# Patient Record
Sex: MALE | Race: WHITE | NOT HISPANIC OR LATINO | Employment: UNEMPLOYED | URBAN - NONMETROPOLITAN AREA
[De-identification: names, ages, dates, MRNs, and addresses within clinical notes are randomized per-mention and may not be internally consistent; named-entity substitution may affect disease eponyms.]

---

## 2023-10-30 ENCOUNTER — OFFICE VISIT (OUTPATIENT)
Dept: URGENT CARE | Facility: CLINIC | Age: 53
End: 2023-10-30
Payer: COMMERCIAL

## 2023-10-30 VITALS
OXYGEN SATURATION: 97 % | WEIGHT: 202.3 LBS | SYSTOLIC BLOOD PRESSURE: 160 MMHG | HEIGHT: 67 IN | BODY MASS INDEX: 31.75 KG/M2 | RESPIRATION RATE: 16 BRPM | HEART RATE: 77 BPM | DIASTOLIC BLOOD PRESSURE: 104 MMHG | TEMPERATURE: 98.9 F

## 2023-10-30 DIAGNOSIS — L98.9 FACIAL SKIN LESION: ICD-10-CM

## 2023-10-30 DIAGNOSIS — I10 UNCONTROLLED HYPERTENSION: ICD-10-CM

## 2023-10-30 DIAGNOSIS — J01.40 ACUTE NON-RECURRENT PANSINUSITIS: Primary | ICD-10-CM

## 2023-10-30 PROBLEM — K91.1 POSTSURGICAL DUMPING SYNDROME: Status: ACTIVE | Noted: 2022-10-26

## 2023-10-30 PROBLEM — E78.5 HLD (HYPERLIPIDEMIA): Status: ACTIVE | Noted: 2017-05-12

## 2023-10-30 PROBLEM — F17.200 SMOKER: Status: ACTIVE | Noted: 2023-10-30

## 2023-10-30 PROCEDURE — 99212 OFFICE O/P EST SF 10 MIN: CPT

## 2023-10-30 RX ORDER — DEXTROMETHORPHAN HBR AND PYRILAMINE MALEATE 30; 30 MG/1; MG/1
1 TABLET ORAL EVERY 6 HOURS
Qty: 28 TABLET | Refills: 0 | Status: SHIPPED | OUTPATIENT
Start: 2023-10-30 | End: 2023-11-06

## 2023-10-30 RX ORDER — AZITHROMYCIN 250 MG/1
TABLET, FILM COATED ORAL
Qty: 6 TABLET | Refills: 0 | Status: SHIPPED | OUTPATIENT
Start: 2023-10-30 | End: 2023-11-04

## 2023-10-30 RX ORDER — AZELASTINE 1 MG/ML
2 SPRAY, METERED NASAL 2 TIMES DAILY
Qty: 30 ML | Refills: 0 | Status: SHIPPED | OUTPATIENT
Start: 2023-10-30 | End: 2023-11-14

## 2023-10-30 RX ORDER — ALBUTEROL SULFATE 90 UG/1
1 AEROSOL, METERED RESPIRATORY (INHALATION) EVERY 4 HOURS PRN
COMMUNITY

## 2023-10-30 ASSESSMENT — VISUAL ACUITY: OU: 1

## 2023-10-30 NOTE — PROGRESS NOTES
[facility]RG[facility] CARE [place] NOTE:      Name: Darci Major Jr., 53 y.o.    CSN:[id number]   MRN:[id number]    PCP: Trent Torres MD    ALL:    Allergies   Allergen Reactions    Penicillins Other and Unknown     When he was a child.  Unsure of reaction       History:    Chief Complaint: Sore Throat (Sore throat, sinus pressure, nausea)    Encounter Date: 10/30/2023  14:17hrs    HPI: The history was obtained from the patient. Darci is a 53 y.o. male, who presents with a chief complaint of Sore Throat (Sore throat, sinus pressure, nausea) less than 6 days of sore throat, nasal congestion, sinus pressure, mentions no fever, generalized malaise or headache, denies smell or taste changes and would not like to pursue any testing for COVID-19, flu RSV.    Patient mentions some GI distress associated with this illness.        PMHx:    CAD  Hypertension  Dyslipidemia  History of respiratory failure/insufficiency status post surgical procedure 2016          Current Outpatient Medications   Medication Sig Dispense Refill    azelastine (Astelin) 137 mcg (0.1 %) nasal spray Administer 2 sprays into each nostril 2 times a day for 15 days. Use in each nostril as directed 30 mL 0    azithromycin (Zithromax Z-Dayron) 250 mg tablet Take 2 tablets (500 mg) on  Day 1,  followed by 1 tablet (250 mg) once daily on Days 2 through 5. 6 tablet 0    pyrilamine-dextromethorphan ([place] DMT) 30-30 mg tablet Take 1 tablet by mouth every 6 hours for 7 days. 28 tablet 0    Ventolin HFA 90 mcg/actuation inhaler Inhale 1 puff every 4 hours if needed for shortness of breath or wheezing.       No current facility-administered medications for this visit.         PMSx:    Past Surgical History:   Procedure Laterality Date    HERNIA REPAIR  04/23/2018    Hernia Repair    OTHER SURGICAL HISTORY  04/23/2018    Cholecystotomy       Fam Hx: No family history on file.    SOC. Hx:     Social History     Socioeconomic History    Marital status:       Spouse name: Not on file    Number of children: Not on file    Years of education: Not on file    Highest education level: Not on file   Occupational History    Not on file   Tobacco Use    Smoking status: Every Day     Types: Cigarettes    Smokeless tobacco: Never   Substance and Sexual Activity    Alcohol use: Not on file    Drug use: Not on file    Sexual activity: Not on file   Other Topics Concern    Not on file   Social History Narrative    Not on file     Social Determinants of Health     Financial Resource Strain: Not on file   Food Insecurity: Not on file   Transportation Needs: Not on file   Physical Activity: Not on file   Stress: Not on file   Social Connections: Not on file   Intimate Partner Violence: Not on file   Housing Stability: Not on file         Vitals:    10/30/23 1403   BP: (!) 160/104   Pulse: 77   Resp: 16   Temp: 37.2 °C (98.9 °F)   SpO2: 97%     91.8 kg (202 lb 4.8 oz)          Physical Exam  Constitutional:       Appearance: Normal appearance. He is normal weight.   HENT:      Head: Normocephalic and atraumatic.      Nose: Congestion and rhinorrhea present. No nasal tenderness. Rhinorrhea is clear.      Mouth/Throat:      Mouth: Mucous membranes are moist.   Eyes:      General: Lids are normal. Vision grossly intact.      Extraocular Movements: Extraocular movements intact.     Cardiovascular:      Rate and Rhythm: Normal rate and regular rhythm.   Pulmonary:      Effort: Pulmonary effort is normal.      Breath sounds: Normal breath sounds. No decreased air movement. No decreased breath sounds.   Abdominal:      General: Abdomen is flat.   Musculoskeletal:         General: Normal range of motion.      Cervical back: Normal range of motion and neck supple.      Comments: Grossly normal strength   Skin:     General: Skin is warm.      Capillary Refill: Capillary refill takes less than 2 seconds.      Findings: Lesion (Noted left infraorbital lesion seems to be waxy and pearly,  erythematous suspicious for BCC) present.   Neurological:      Mental Status: He is alert and oriented to person, place, and time.   Psychiatric:         Behavior: Behavior normal.         LABORATORY @ RADIOLOGICAL IMAGING (if done):     Patient was not interested in any testing today     COURSE/MEDICAL DECISION MAKING:    Darci is a 53 y.o., who presents with a working diagnosis of   1. Acute non-recurrent pansinusitis    2. Facial skin lesion    3. Uncontrolled hypertension     with a differential to include: Influenza, parainfluenza, rhinovirus, adenovirus, metapneumovirus, coronavirus, COVID-19, postnasal drip, strep pharyngitis, GERD, retropharyngeal abscess, tonsillitis, adenitis, seasonal allergies    Patient has a longstanding history of respiratory conditions most interesting happened in 2016 where he was admitted to the hospital following an abdominal hernia repair.  He was sent home on oxygen for a few months and has since then required per record inhalers periodically.      He also has persistent nasal congestion and    Clinical Impression:  1. Acute non-recurrent pansinusitis    2. Facial skin lesion    3. Uncontrolled hypertension        Jose Ramon Kay PA-C   Advanced Practice Provider  [facility] URGENT [facility]ARE

## 2023-10-30 NOTE — PATIENT INSTRUCTIONS
"What is sinusitis?  Sinusitis is a condition that can cause a stuffy nose, pain in the face, and discharge or \"mucus\" from the nose.    The sinuses are hollow areas in the bones of the face (figure 1). They have a thin lining that normally makes a small amount of mucus. When this lining gets irritated or infected, it swells and makes extra mucus. This causes symptoms.    Sinusitis usually happens after a person gets sick with a cold. The germs causing the cold can infect the sinuses, too. Sometimes, other germs can be the cause of the infection. Often, a person feels like their cold is getting better. But then, they get sinusitis and begin to feel sick again.    What are the symptoms of sinusitis?  Common symptoms of sinusitis include:    ?Stuffy or blocked nose    ?Thick white, yellow, or green discharge from the nose    ?Pain in the teeth    ?Pain or pressure in the face - This often feels worse when a person bends forward.    People with sinusitis can also have other symptoms, such as:    ?Fever    ?Cough    ?Trouble smelling    ?Ear pressure or fullness    ?Headache    ?Bad breath    ?Feeling tired    Most of the time, symptoms start to improve in 7 to 10 days.    Should I see a doctor or nurse?  See your doctor or nurse if your symptoms last more than 10 days, or if your symptoms first get better but then get worse.    Rarely, sinusitis can lead to serious problems. See your doctor or nurse right away (do not wait 10 days) if you have:    ?Fever higher than 102°F (38.9°C)    ?Sudden and severe pain in the face and head    ?Trouble seeing, or seeing double    ?Trouble thinking clearly    ?Swelling or redness around 1 or both eyes    ?Stiff neck    Is there anything I can do on my own to feel better?  Yes. To help with your symptoms, you can:    ?Take an over-the-counter pain reliever to reduce the pain.    ?Rinse your nose and sinuses with salt water a few times a day - Ask your doctor or nurse about the best " "way to do this.    ?Drink plenty of fluids - Staying hydrated might help to thin the mucus and make it drain more easily.    Your doctor might also recommend a steroid nose spray to reduce the swelling in your nose, especially if you have allergies. Talk to your doctor if you are thinking of using a steroid spray.    How is sinusitis treated?  Most of the time, sinusitis does not need to be treated with antibiotic medicines. This is because most sinusitis is caused by viruses, not bacteria, and antibiotics do not kill viruses. In fact, even sinusitis caused by bacteria will usually get better on its own without antibiotics.    Some people with sinusitis do need treatment with antibiotics. If your symptoms have not improved after 10 days, ask your doctor if you should take antibiotics. They might recommend that you wait 1 more week to see if your symptoms improve. But if you have symptoms such as a fever or a lot of pain, they might prescribe antibiotics. If you do get antibiotics, follow all of your doctor's instructions about taking them.    What if my symptoms do not get better?  If your symptoms do not get better, talk with your doctor or nurse. They might order tests to figure out why you still have symptoms. These can include:    ?CT scan or other imaging tests - Imaging tests create pictures of the inside of the body.    ?A test to look inside the sinuses - For this test, a doctor puts a thin tube with a camera on the end into the nose and up into the sinuses.    Some people get a lot of sinus infections or have symptoms that last at least 3 months. These people can have a different type of sinusitis called \"chronic sinusitis.\" Chronic sinusitis can be caused by different things. For example, some people have growths inside their nose or sinuses that are called \"polyps.\" Other people have allergies that cause their symptoms.    Chronic sinusitis can be treated in different ways. If you have chronic sinusitis, " "talk with your doctor about which treatments are right for you.        What is high blood pressure?  High blood pressure is a condition that puts you at risk for heart attack, stroke, and kidney disease. It does not usually cause symptoms. But it can be serious.    When your doctor or nurse tells you your blood pressure, they say 2 numbers. For instance, your doctor or nurse might say that your blood pressure is \"130 over 80.\" The top number is the pressure inside your arteries when your heart is bill. The bottom number is the pressure inside your arteries when your heart is relaxed.    \"Elevated blood pressure\" is a term doctors or nurses use as a warning. People with elevated blood pressure do not yet have high blood pressure. But their blood pressure is not as low as it should be for good health.    Many experts define high, elevated, and normal blood pressure as follows:    ?High - Top number of 130 or above and/or bottom number of 80 or above.    ?Elevated - Top number between 120 and 129 and bottom number of 79 or below.    ?Normal - Top number of 119 or below and bottom number of 79 or below.    This information is also in the table (table 1).    How can I lower my blood pressure?  If your doctor or nurse prescribed blood pressure medicine, the most important thing you can do is to take it. If it causes side effects, do not just stop taking it. Instead, talk to your doctor or nurse about the problems it causes. They might be able to lower your dose or switch you to another medicine. If cost is a problem, mention that, too. They might be able to put you on a less expensive medicine. Taking your blood pressure medicine can keep you from having a heart attack or stroke, and it can save your life!    Can I do anything on my own?  You have a lot of control over your blood pressure. To lower it:    ?Lose weight (if you are overweight).    ?Choose a diet low in fat and rich in fruits, vegetables, and low-fat " dairy products.    ?Eat less salt.    ?Do something active for at least 30 minutes a day on most days of the week.    ?Drink less alcohol (if you drink more than 2 alcoholic drinks per day).    It's also a good idea to get a home blood pressure meter. People who check their own blood pressure at home do better at keeping it low and can sometimes even reduce the amount of medicine they take.    Basal cell carcinoma (BCC) is the most common form of skin cancer and the most frequently occurring form of all cancers. In the [place] alone, an estimated 3.6 million cases are diagnosed each year. BCCs arise from abnormal, uncontrolled growth of basal cells.    Because BCCs grow slowly, most are curable and cause minimal damage when caught and treated early. Understanding BCC causes, risk factors and warning signs can help you detect them early, when they are easiest to treat and cure.

## 2023-11-30 DIAGNOSIS — J01.40 ACUTE NON-RECURRENT PANSINUSITIS: ICD-10-CM

## 2023-11-30 DIAGNOSIS — L98.9 FACIAL SKIN LESION: ICD-10-CM

## 2024-01-17 RX ORDER — AZELASTINE 1 MG/ML
2 SPRAY, METERED NASAL 2 TIMES DAILY
OUTPATIENT
Start: 2024-01-17 | End: 2024-02-01

## 2024-09-13 ENCOUNTER — APPOINTMENT (OUTPATIENT)
Dept: URGENT CARE | Facility: CLINIC | Age: 54
End: 2024-09-13
Payer: COMMERCIAL

## 2025-01-10 ENCOUNTER — OFFICE VISIT (OUTPATIENT)
Dept: URGENT CARE | Facility: CLINIC | Age: 55
End: 2025-01-10
Payer: COMMERCIAL

## 2025-01-10 VITALS
WEIGHT: 200 LBS | SYSTOLIC BLOOD PRESSURE: 147 MMHG | DIASTOLIC BLOOD PRESSURE: 99 MMHG | HEART RATE: 92 BPM | TEMPERATURE: 98.5 F | BODY MASS INDEX: 31.32 KG/M2 | OXYGEN SATURATION: 94 % | RESPIRATION RATE: 16 BRPM

## 2025-01-10 DIAGNOSIS — J01.90 ACUTE SINUSITIS, RECURRENCE NOT SPECIFIED, UNSPECIFIED LOCATION: Primary | ICD-10-CM

## 2025-01-10 PROCEDURE — 99213 OFFICE O/P EST LOW 20 MIN: CPT | Performed by: NURSE PRACTITIONER

## 2025-01-10 RX ORDER — BENZONATATE 100 MG/1
100-200 CAPSULE ORAL EVERY 8 HOURS PRN
Qty: 40 CAPSULE | Refills: 0 | Status: SHIPPED | OUTPATIENT
Start: 2025-01-10

## 2025-01-10 RX ORDER — DOXYCYCLINE 100 MG/1
100 TABLET ORAL 2 TIMES DAILY
Qty: 20 TABLET | Refills: 0 | Status: SHIPPED | OUTPATIENT
Start: 2025-01-10 | End: 2025-01-20

## 2025-01-10 NOTE — PROGRESS NOTES
Left message for patient to call back.  Please ask patient what pharmacy they are currently using.   [facility] URGENT CARE  Jeanette Teixeira, APRN-CNP     Visit Note - 1/10/2025 3:50 PM   This note was generated with voice recognition software and may contain errors including spelling, grammar, syntax, and misrecognization of what was dictated.    Patient: Darci Major Jr., MRN: [id number], 54 y.o., male   PCP: Trent Torres MD  ------------------------------------  ALLERGIES:   Allergies   Allergen Reactions    Penicillins Other and Unknown     When he was a child.  Unsure of reaction        CURRENT MEDICATIONS:   Current Outpatient Medications   Medication Instructions    azelastine (Astelin) 137 mcg (0.1 %) nasal spray 2 sprays, Each Nostril, 2 times daily, Use in each nostril as directed    benzonatate (TESSALON) 100-200 mg, oral, Every 8 hours PRN, Do not crush or chew.    doxycycline (ADOXA) 100 mg, oral, 2 times daily, Take with a full glass of water and do not lie down for at least 30 minutes after.    Ventolin HFA 90 mcg/actuation inhaler 1 puff, Every 4 hours PRN     ------------------------------------  PAST MEDICAL HX:  Patient Active Problem List   Diagnosis    Postsurgical dumping syndrome    HLD (hyperlipidemia)    Essential hypertension    CAD (coronary artery disease)    Smoker      SURGICAL HX:  Past Surgical History:   Procedure Laterality Date    HERNIA REPAIR  04/23/2018    Hernia Repair    OTHER SURGICAL HISTORY  04/23/2018    Cholecystotomy      FAMILY HX:   No pertinent history.   SOCIAL HX:    reports that he has been smoking cigarettes. He has never used smokeless tobacco. Is self-employed.   ------------------------------------  CHIEF COMPLAINT:   Chief Complaint   Patient presents with    Sinusitis     Sinus pressure/drainage, cough x 10 days       HISTORY OF PRESENT ILLNESS: The history was obtained from patientPierce Bejarano is a 54 y.o. male, who presents with a chief complaint of nasal congestion (clear mucus), sinus pressure/headaches, and a cough (usually dry, but  occasionally productive with clear phlegm) - sxs started on 12/28/24. Reports he also had a stomachache, diarrhea, and fevers/chills at onset, but these have resolved. Denies any body aches, sore throat, ear pain, abdominal pain, chest pain, wheezing/shortness of breath, rashes, urinary symptoms, and nausea/vomiting. Denies any lightheadedness or dizziness; no changes in mental status. No swelling in legs. Appetite is normal; is able to eat and drink fluids without difficulty; denies loss of sense of taste or smell. Reports symptoms  have persisted without much change since onset . Has been taking  Mucinex  without much relief; no other over-the-counter medications or home remedies for symptom management. No known ill contacts. Has not received the COVID vaccine. Has not received this season's influenza vaccine.. Last known COVID infection was in 2024.  Is a current smoker.  No known history of asthma/COPD/respiratory issues. No recent antibiotic use.       REVIEW OF SYSTEMS:  10 systems reviewed negative with exception of history of present illness as listed above.    TODAY'S VITALS: BP (!) 147/99 (BP Location: Right arm, Patient Position: Sitting, BP Cuff Size: Large adult)   Pulse 92   Temp 36.9 °C (98.5 °F) (Temporal)   Resp 16   Wt 90.7 kg (200 lb)   SpO2 94%   BMI 31.32 kg/m²   Recheck SpO2: 97%.    PHYSICAL EXAMINATION:  General:  Mildly ill-appearing, well nourished male; alert and oriented; in no acute distress. Sitting comfortably on exam chair. Non-dyspneic.   Eyes:  Pupils equal, round and reactive to light. No conjunctival erythema; no scleral icterus.   HENT: + ethmoid, frontal, and maxillary sinus tenderness; + audible nasal congestion. Airway patent, TMs and ear canals clear/unremarkable bilaterally. Nasal mucosa mildly injected and edematous. Oral mucosa moist. Posterior pharynx mildly injected but without lesions or oropharyngeal exudate aside from PND. Uvula is midline. Managing oral  secretions without difficulty.  Neck:  Supple. Mildly tender, mobile anterior cervical lymphadenopathy bilat. Trachea is midline.  Respiratory:  Respirations easy and unlabored, Breath sounds equal. Lungs are clear to auscultation; no wheezes, rhonchi, or rales; has good air movement throughout. + non-productive cough noted. Non-dyspneic with ambulation; able to maintain SpO2.  Cardiovascular:  Normal rate, Regular rhythm. Normal S1S2. No m/r/g. No peripheral edema.   Gastrointestinal:  Soft, non-tender, non-distended; no palpable masses or organomegaly. Bowel sounds normoactive.  Musculoskeletal:  Grossly normal; appropriate for age.     Integumentary:  Pink, warm, dry, and intact. No rashes or skin discoloration appreciated. Good skin turgor.  Neurologic:  Alert and oriented, no gross deficits.    Cognition and Speech:  Oriented, Speech clear and coherent.    Psychiatric:  Cooperative, Appropriate mood & affect.       ------------------------------------  Medical Decision Making  LABORATORY or RADIOLOGICAL IMAGING ORDERS/RESULTS:   None    IMPRESSION/PLAN:  Course: Worsening; stable    1. Acute sinusitis, recurrence not specified, unspecified location (Primary)  - doxycycline (Adoxa) 100 mg tablet; Take 1 tablet (100 mg) by mouth 2 times a day for 10 days. Take with a full glass of water and do not lie down for at least 30 minutes after.  Dispense: 20 tablet; Refill: 0  - benzonatate (Tessalon) 100 mg capsule; Take 1-2 capsules (100-200 mg) by mouth every 8 hours if needed for cough. Do not crush or chew.  Dispense: 40 capsule; Refill: 0    No red flags on exam today. Symptoms consistent with sinusitis, although reviewed other potential etiologies. Due to severity/duration of sxs, will begin treatment for bacterial infection today (Doxycycline). Should finish full course of antibiotics, even if symptoms resolve more quickly. Will also start benzonatate for PRN use. Instructed to push fluids, rest, and to use  appropriate over the counter medications as needed for management of symptoms - discussed that Mucinex, vaporizer, and Saline Nasal Spray may be helpful.  Reviewed red flags to monitor for, counseled on potential adverse reactions of treatments, expectations for improvement in sxs, and advised to follow-up with primary care provider in 3-5 days if symptoms persist, or sooner if worsening or if any additional concerns/red flags develop. BP today was elevated and did not improve upon recheck - patient asymptomatic - urged to follow up on this with PCP ASAP, and should seek care immediately if he develops any CP/SOB/red flags in the meantime. Patient verbalized understanding and agreed with plan of care; questions were encouraged and answered.       FRANCISCO Lester-CNP   Advanced Practice Provider  [facility] URGENT CARE

## 2025-02-21 ENCOUNTER — OFFICE VISIT (OUTPATIENT)
Dept: URGENT CARE | Facility: CLINIC | Age: 55
End: 2025-02-21
Payer: COMMERCIAL

## 2025-02-21 VITALS
TEMPERATURE: 98.2 F | WEIGHT: 200 LBS | DIASTOLIC BLOOD PRESSURE: 100 MMHG | SYSTOLIC BLOOD PRESSURE: 155 MMHG | OXYGEN SATURATION: 96 % | RESPIRATION RATE: 14 BRPM | HEART RATE: 81 BPM | BODY MASS INDEX: 31.32 KG/M2

## 2025-02-21 DIAGNOSIS — K04.7 DENTAL INFECTION: Primary | ICD-10-CM

## 2025-02-21 PROCEDURE — 99213 OFFICE O/P EST LOW 20 MIN: CPT | Performed by: NURSE PRACTITIONER

## 2025-02-21 RX ORDER — CLINDAMYCIN HYDROCHLORIDE 300 MG/1
300 CAPSULE ORAL 4 TIMES DAILY
Qty: 40 CAPSULE | Refills: 0 | Status: SHIPPED | OUTPATIENT
Start: 2025-02-21 | End: 2025-03-03

## 2025-02-21 RX ORDER — NAPROXEN 500 MG/1
500 TABLET ORAL EVERY 12 HOURS PRN
Qty: 20 TABLET | Refills: 0 | Status: SHIPPED | OUTPATIENT
Start: 2025-02-21

## 2025-02-21 NOTE — PROGRESS NOTES
[facility] URGENT CARE  Jeanette TATI Teixeira, APRN-CNP     Visit Note - 2/21/2025 4:49 PM   This note was generated with voice recognition software and may contain errors including spelling, grammar, syntax, and misrecognization of what was dictated.    Patient: Darci Major Jr., MRN: [id number], 54 y.o., male   PCP: Trent Torres MD  ------------------------------------  ALLERGIES:   Allergies   Allergen Reactions    Penicillins Other and Unknown     When he was a child.  Unsure of reaction        CURRENT MEDICATIONS:   Current Outpatient Medications   Medication Instructions    azelastine (Astelin) 137 mcg (0.1 %) nasal spray 2 sprays, Each Nostril, 2 times daily, Use in each nostril as directed    benzonatate (TESSALON) 100-200 mg, oral, Every 8 hours PRN, Do not crush or chew.    clindamycin (CLEOCIN HCL) 300 mg, oral, 4 times daily, Take with a meal.    naproxen (NAPROSYN) 500 mg, oral, Every 12 hours PRN, Take with a meal.    Ventolin HFA 90 mcg/actuation inhaler 1 puff, Every 4 hours PRN     ------------------------------------  PAST MEDICAL HX:  Patient Active Problem List   Diagnosis    Postsurgical dumping syndrome    HLD (hyperlipidemia)    Essential hypertension    CAD (coronary artery disease)    Smoker      SURGICAL HX:  Past Surgical History:   Procedure Laterality Date    HERNIA REPAIR  04/23/2018    Hernia Repair    OTHER SURGICAL HISTORY  04/23/2018    Cholecystotomy      FAMILY HX:   No pertinent history.   SOCIAL HX:    reports that he has been smoking cigarettes. He has never used smokeless tobacco.  ------------------------------------  CHIEF COMPLAINT:   Chief Complaint   Patient presents with    Dental Pain     Dental pain x 1 day      HISTORY OF PRESENT ILLNESS: The history was obtained from patientPierce Bejarano is a 54 y.o. male, who presents with a chief complaint of     REVIEW OF SYSTEMS:  10 systems reviewed negative with exception of history of present illness as listed  above.    TODAY'S VITALS: BP (!) 155/100 (BP Location: Left arm, Patient Position: Sitting, BP Cuff Size: Large adult)   Pulse 81   Temp 36.8 °C (98.2 °F) (Temporal)   Resp 14   Wt 90.7 kg (200 lb)   SpO2 96%   BMI 31.32 kg/m²     PHYSICAL EXAMINATION:      ------------------------------------  Medical Decision Making  LABORATORY or RADIOLOGICAL IMAGING ORDERS/RESULTS:   None    IMPRESSION/PLAN:  Course: Worsening; stable    1. Dental infection (Primary)  - clindamycin (Cleocin HCL) 300 mg capsule; Take 1 capsule (300 mg) by mouth 4 times a day for 10 days. Take with a meal.  Dispense: 40 capsule; Refill: 0  - naproxen (Naprosyn) 500 mg tablet; Take 1 tablet (500 mg) by mouth every 12 hours if needed (pain/inflammation). Take with a meal.  Dispense: 20 tablet; Refill: 0        AARON Lester   Advanced Practice Provider  [facility] URGENT CARE   appreciated. Good pulses/perfusion.  Neurologic:  Alert, Oriented, Sensory and motor function grossly intact for age.   Cognition and Speech: Oriented; Speech clear and coherent  Psychiatric:  Cooperative, Appropriate mood & affect.     ------------------------------------  Medical Decision Making  LABORATORY or RADIOLOGICAL IMAGING ORDERS/RESULTS:   None    IMPRESSION/PLAN:  Course: Worsening; stable    1. Dental infection (Primary)  - clindamycin (Cleocin HCL) 300 mg capsule; Take 1 capsule (300 mg) by mouth 4 times a day for 10 days. Take with a meal.  Dispense: 40 capsule; Refill: 0  - naproxen (Naprosyn) 500 mg tablet; Take 1 tablet (500 mg) by mouth every 12 hours if needed (pain/inflammation). Take with a meal.  Dispense: 20 tablet; Refill: 0    No red flags on exam, but will need close monitoring; No evidence of drainable abscess or of Giovanny Angina at this time. Will begin antibiotic treatment for dental infection today (Clindamycin), but stressed importance of following up with dental clinic, as without intervention, infection can continue to reoccur. Urged to complete full course of treatment, even if symptoms resolve more quickly. Encouraged to rest, to push fluids, and NSAIDs for discomfort; can use Tylenol as adjunct (BP today was elevated - patient agrees to recheck BP at home, and if it remains elevated, will hold NSAID and use Tylenol, only). Warm salt water gargles and cool compresses may also be helpful. Reviewed red flags to monitor for, expectations for resolution of infection, instructions and common side effects of treatment, and encouraged to RTC or see PCP/dentist if symptoms not improving over the next 24-48 hours, or to seek care sooner if sxs worsen/any red flags develop. Should also contact dentist ASAP to let them know that antibiotic treatment was started, and to make plan for f/u. Should also follow up with PCP re: elevated BP, and should seek care immediately if he becomes  symptomatic in the meantime. Patient seems satisfied and agrees with plan of care; questions were encouraged and answered.         FRANCISCO Lester-CNP   Advanced Practice Provider  [facility] URGENT [facility]

## 2025-02-28 ENCOUNTER — APPOINTMENT (OUTPATIENT)
Dept: RADIOLOGY | Facility: HOSPITAL | Age: 55
End: 2025-02-28
Payer: COMMERCIAL

## 2025-03-04 ENCOUNTER — HOSPITAL ENCOUNTER (OUTPATIENT)
Dept: RADIOLOGY | Facility: HOSPITAL | Age: 55
Discharge: HOME | End: 2025-03-04
Payer: COMMERCIAL

## 2025-03-04 DIAGNOSIS — S46.211A STRAIN OF MUSCLE, FASCIA AND TENDON OF OTHER PARTS OF BICEPS, RIGHT ARM, INITIAL ENCOUNTER: ICD-10-CM

## 2025-03-04 PROCEDURE — 73221 MRI JOINT UPR EXTREM W/O DYE: CPT | Mod: RT
